# Patient Record
Sex: FEMALE | Race: WHITE | NOT HISPANIC OR LATINO
[De-identification: names, ages, dates, MRNs, and addresses within clinical notes are randomized per-mention and may not be internally consistent; named-entity substitution may affect disease eponyms.]

---

## 2017-01-18 ENCOUNTER — APPOINTMENT (OUTPATIENT)
Dept: ENDOCRINOLOGY | Facility: CLINIC | Age: 62
End: 2017-01-18

## 2017-01-18 VITALS — HEART RATE: 80 BPM

## 2018-01-15 ENCOUNTER — APPOINTMENT (OUTPATIENT)
Dept: ENDOCRINOLOGY | Facility: CLINIC | Age: 63
End: 2018-01-15
Payer: COMMERCIAL

## 2018-01-15 VITALS
SYSTOLIC BLOOD PRESSURE: 120 MMHG | WEIGHT: 112 LBS | BODY MASS INDEX: 18.66 KG/M2 | HEIGHT: 65 IN | HEART RATE: 78 BPM | DIASTOLIC BLOOD PRESSURE: 68 MMHG

## 2018-01-15 PROCEDURE — 99213 OFFICE O/P EST LOW 20 MIN: CPT

## 2019-01-15 ENCOUNTER — APPOINTMENT (OUTPATIENT)
Dept: ENDOCRINOLOGY | Facility: CLINIC | Age: 64
End: 2019-01-15
Payer: MEDICARE

## 2019-01-15 VITALS
SYSTOLIC BLOOD PRESSURE: 110 MMHG | HEIGHT: 65 IN | BODY MASS INDEX: 19.22 KG/M2 | WEIGHT: 115.38 LBS | DIASTOLIC BLOOD PRESSURE: 89 MMHG

## 2019-01-15 PROCEDURE — 99213 OFFICE O/P EST LOW 20 MIN: CPT

## 2019-01-15 RX ORDER — LACTOBACILLUS ACIDOPHILUS/PECT 30 MG-20MG
TABLET ORAL
Refills: 0 | Status: ACTIVE | COMMUNITY

## 2019-01-15 RX ORDER — MAGNESIUM OXIDE/MAG AA CHELATE 300 MG
CAPSULE ORAL
Refills: 0 | Status: ACTIVE | COMMUNITY

## 2019-01-15 NOTE — PHYSICAL EXAM
[Alert] : alert [No Acute Distress] : no acute distress [Well Nourished] : well nourished [Well Developed] : well developed [Normal Sclera/Conjunctiva] : normal sclera/conjunctiva [EOMI] : extra ocular movement intact [No Proptosis] : no proptosis [Normal Oropharynx] : the oropharynx was normal [Thyroid Not Enlarged] : the thyroid was not enlarged [No Thyroid Nodules] : there were no palpable thyroid nodules [No Respiratory Distress] : no respiratory distress [No Accessory Muscle Use] : no accessory muscle use [Clear to Auscultation] : lungs were clear to auscultation bilaterally [Normal Rate] : heart rate was normal  [Normal S1, S2] : normal S1 and S2 [Regular Rhythm] : with a regular rhythm [Pedal Pulses Normal] : the pedal pulses are present [No Edema] : there was no peripheral edema [Normal Bowel Sounds] : normal bowel sounds [Not Tender] : non-tender [Soft] : abdomen soft [Not Distended] : not distended [Post Cervical Nodes] : posterior cervical nodes [Anterior Cervical Nodes] : anterior cervical nodes [Axillary Nodes] : axillary nodes [Normal] : normal and non tender [No Stigmata of Cushings Syndrome] : no stigmata of cushings syndrome [Normal Gait] : normal gait [Normal Strength/Tone] : muscle strength and tone were normal [No Rash] : no rash [Acanthosis Nigricans] : no acanthosis nigricans [Normal Reflexes] : deep tendon reflexes were 2+ and symmetric [No Tremors] : no tremors [Oriented x3] : oriented to person, place, and time [de-identified] : Lower spine prominence with the curvature

## 2019-01-15 NOTE — HISTORY OF PRESENT ILLNESS
[FreeTextEntry1] : Patient with long-standing history of a multinodular goiter discovered while she was being evaluated for breast cancer. She has had the thyroid nodules biopsies in the past. She denies any local neck discomfort or hoarseness and she is clinically euthyroid. She had a thyroid ultrasound recently which  was stable .Her recent TSH was normal. She states she has been on medications for breast cancer she has developed scoliosis. She was told she has osteopenia of the spine.

## 2019-01-15 NOTE — ASSESSMENT
[FreeTextEntry1] : Patient will multinodular goiter whichh is stable on exam and recent sonogram. Since her thyroid function is normal she will follow up in one year and call if there is any change in her health status. She will give me a copy of her bone density report as well as the MRI of the spine which I said would review for her. A1C slightly high so diet was discussed,

## 2020-01-21 ENCOUNTER — APPOINTMENT (OUTPATIENT)
Dept: ENDOCRINOLOGY | Facility: CLINIC | Age: 65
End: 2020-01-21
Payer: MEDICARE

## 2020-01-21 VITALS
OXYGEN SATURATION: 96 % | SYSTOLIC BLOOD PRESSURE: 110 MMHG | DIASTOLIC BLOOD PRESSURE: 82 MMHG | WEIGHT: 112 LBS | HEIGHT: 64.5 IN | BODY MASS INDEX: 18.89 KG/M2 | HEART RATE: 83 BPM

## 2020-01-21 DIAGNOSIS — M85.80 OTHER SPECIFIED DISORDERS OF BONE DENSITY AND STRUCTURE, UNSPECIFIED SITE: ICD-10-CM

## 2020-01-21 PROCEDURE — 99214 OFFICE O/P EST MOD 30 MIN: CPT

## 2020-01-21 NOTE — PHYSICAL EXAM
[Alert] : alert [No Acute Distress] : no acute distress [Well Nourished] : well nourished [Well Developed] : well developed [Normal Sclera/Conjunctiva] : normal sclera/conjunctiva [EOMI] : extra ocular movement intact [No Proptosis] : no proptosis [Normal Oropharynx] : the oropharynx was normal [Thyroid Not Enlarged] : the thyroid was not enlarged [No Thyroid Nodules] : there were no palpable thyroid nodules [No Respiratory Distress] : no respiratory distress [No Accessory Muscle Use] : no accessory muscle use [Clear to Auscultation] : lungs were clear to auscultation bilaterally [Normal S1, S2] : normal S1 and S2 [Normal Rate] : heart rate was normal  [Pedal Pulses Normal] : the pedal pulses are present [Regular Rhythm] : with a regular rhythm [No Edema] : there was no peripheral edema [Not Tender] : non-tender [Normal Bowel Sounds] : normal bowel sounds [Soft] : abdomen soft [Not Distended] : not distended [Post Cervical Nodes] : posterior cervical nodes [Axillary Nodes] : axillary nodes [Anterior Cervical Nodes] : anterior cervical nodes [Normal] : normal and non tender [No Stigmata of Cushings Syndrome] : no stigmata of cushings syndrome [Normal Gait] : normal gait [Normal Strength/Tone] : muscle strength and tone were normal [No Rash] : no rash [Acanthosis Nigricans] : no acanthosis nigricans [Normal Reflexes] : deep tendon reflexes were 2+ and symmetric [No Tremors] : no tremors [Oriented x3] : oriented to person, place, and time [de-identified] : Lower spine prominence with the curvature

## 2020-01-21 NOTE — HISTORY OF PRESENT ILLNESS
[FreeTextEntry1] : Patient with long-standing history of a multinodular goiter discovered while she was being evaluated for breast cancer. She has had the thyroid nodules biopsies in the past. She denies any local neck discomfort or hoarseness and she is clinically euthyroid. She had a thyroid ultrasound recently which  was stable .Her recent TSH was normal. She states she has been on medications for breast cancer she has developed scoliosis. She was told she has osteopenia of the spine. She feels her hair is thinning. She may need her breast implants changed because she was told there is a low risk of cancer associated with this type.

## 2020-01-21 NOTE — ASSESSMENT
[FreeTextEntry1] : Patient will multinodular goiter which is stable on exam and recent sonogram. Since her thyroid function is normal she will follow up in one year and call if there is any change in her health status. She will give me a copy of her bone density report as well as the MRI of the spine which I said would review for her. A1C slightly high so diet was discussed, We are calling radiology for the full BD reports for the past 2 years. Will call to discuss. She feels she has lost over 3 inches. She will consider having more labs addressing hair thinning.  We briefly discussed spironolactone.  [Importance of Diet and Exercise] : importance of diet and exercise to improve glycemic control, achieve weight loss and improve cardiovascular health

## 2020-02-13 ENCOUNTER — LABORATORY RESULT (OUTPATIENT)
Age: 65
End: 2020-02-13

## 2020-02-13 LAB
CORTIS SERPL-MCNC: 12.5 UG/DL
FSH SERPL-MCNC: 61.3 IU/L
LH SERPL-ACNC: 27.2 IU/L
SAVE SPECIMEN: NORMAL
T4 FREE SERPL-MCNC: 1.2 NG/DL

## 2020-02-18 LAB
ALBUMIN SERPL ELPH-MCNC: 4.5 G/DL
ALP BLD-CCNC: 95 U/L
ALP BONE SERPL-MCNC: 22 MCG/L
ALT SERPL-CCNC: 23 U/L
ANION GAP SERPL CALC-SCNC: 20 MMOL/L
AST SERPL-CCNC: 28 U/L
BILIRUB SERPL-MCNC: 0.4 MG/DL
BUN SERPL-MCNC: 13 MG/DL
CALCIUM SERPL-MCNC: 9.7 MG/DL
CALCIUM SERPL-MCNC: 9.7 MG/DL
CHLORIDE SERPL-SCNC: 104 MMOL/L
CO2 SERPL-SCNC: 20 MMOL/L
COLLAGEN CTX SERPL-MCNC: 961 PG/ML
CREAT SERPL-MCNC: 0.65 MG/DL
DEPRECATED KAPPA LC FREE/LAMBDA SER: 1.49 RATIO
FREE KAPPA URINE: 2.58 MG/L
FREE KAPPA/LAMDA RATIO: 6.79
FREE LAMDA URINE: 0.38 MG/L
GLUCOSE SERPL-MCNC: 93 MG/DL
KAPPA LC CSF-MCNC: 0.79 MG/DL
KAPPA LC SERPL-MCNC: 1.18 MG/DL
M PROTEIN SPEC IFE-MCNC: NORMAL
PARATHYROID HORMONE INTACT: 23 PG/ML
POTASSIUM SERPL-SCNC: 4.1 MMOL/L
PROT SERPL-MCNC: 6.6 G/DL
SODIUM SERPL-SCNC: 144 MMOL/L
TRYPTASE: 7.2 NG/ML

## 2020-03-02 LAB
MISCELLANEOUS TEST: NORMAL
PROC NAME: NORMAL

## 2020-06-10 ENCOUNTER — APPOINTMENT (OUTPATIENT)
Dept: ENDOCRINOLOGY | Facility: CLINIC | Age: 65
End: 2020-06-10

## 2021-01-19 ENCOUNTER — APPOINTMENT (OUTPATIENT)
Dept: ENDOCRINOLOGY | Facility: CLINIC | Age: 66
End: 2021-01-19
Payer: MEDICARE

## 2021-01-19 PROCEDURE — 99213 OFFICE O/P EST LOW 20 MIN: CPT | Mod: 95

## 2021-01-19 RX ORDER — ALENDRONATE SODIUM 70 MG/1
70 TABLET ORAL
Qty: 12 | Refills: 1 | Status: ACTIVE | COMMUNITY
Start: 2020-03-05 | End: 1900-01-01

## 2021-01-19 NOTE — HISTORY OF PRESENT ILLNESS
[Home] : at home, [unfilled] , at the time of the visit. [Medical Office: ([facility])___] : at the medical office located in  [Verbal consent obtained from patient] : the patient, [unfilled] [FreeTextEntry1] : Patient with long-standing history of a multinodular goiter discovered while she was being evaluated for breast cancer. She has had the thyroid nodules biopsies in the past. She denies any local neck discomfort or hoarseness and she is clinically euthyroid. She had a thyroid ultrasound recently which  was stable .Her recent TSH was normal. She states she has been on medications for breast cancer she has developed scoliosis. Since last year she has been on Alendronate for osteoporosis.

## 2021-01-19 NOTE — ASSESSMENT
[FreeTextEntry1] : Patient will multinodular goiter which was last year on a sonogram. Recent thyroid function is normal .Held off doing sonogram due to pandemic. Will try to repeat prior to next visit. \par Bone density  showed T score - 2.8 neck of right hip in 2019 . Significant scoliosis so could not rely on spine reading. She feels she has lost over 3 inches.She has been tolerating alendronate now for almost one year. \par f/u with labs in 6 months.  [Bisphosphonate Therapy] : Risks and benefits of bisphosphonate therapy were  discussed with the patient including gastroesophageal irritation, osteonecrosis of the jaw, and atypical femur fractures, and acute phase reaction [Bisphosphonates] : The patient was instructed to take bisphosphonates on an empty stomach with a full glass of water,and wait at least 30 minutes before eating or lying down

## 2021-01-19 NOTE — PHYSICAL EXAM
[Alert] : alert [No Acute Distress] : no acute distress [Normal Voice/Communication] : normal voice communication [Thyroid Not Enlarged] : the thyroid was not enlarged [No Respiratory Distress] : no respiratory distress [No Rash] : no rash [Oriented x3] : oriented to person, place, and time [Normal Insight/Judgement] : insight and judgment were intact [de-identified] : Limited exam due to Telehealth visit secondary to Covid pandemic

## 2021-02-15 ENCOUNTER — NON-APPOINTMENT (OUTPATIENT)
Age: 66
End: 2021-02-15

## 2023-03-20 ENCOUNTER — APPOINTMENT (OUTPATIENT)
Dept: ENDOCRINOLOGY | Facility: CLINIC | Age: 68
End: 2023-03-20
Payer: MEDICARE

## 2023-03-20 VITALS
DIASTOLIC BLOOD PRESSURE: 84 MMHG | HEART RATE: 92 BPM | OXYGEN SATURATION: 96 % | SYSTOLIC BLOOD PRESSURE: 136 MMHG | BODY MASS INDEX: 19.46 KG/M2 | HEIGHT: 64.5 IN | WEIGHT: 115.38 LBS

## 2023-03-20 PROCEDURE — 99214 OFFICE O/P EST MOD 30 MIN: CPT

## 2023-03-20 NOTE — REASON FOR VISIT
[Follow - Up] : a follow-up visit [Thyroid nodule/ MNG] : thyroid nodule/ MNG [Osteoporosis] : osteoporosis

## 2023-03-20 NOTE — HISTORY OF PRESENT ILLNESS
[FreeTextEntry1] : Patient with long-standing history of a multinodular goiter discovered while she was being evaluated for breast cancer. She has had the thyroid nodules biopsies in the past. She denies any local neck discomfort or hoarseness and she is clinically euthyroid. She had a thyroid ultrasound recently which  was stable .Her recent TSH was normal. She states she has been on medications for breast cancer she has developed scoliosis. Since last year she has been on Alendronate for osteoporosis.

## 2023-03-20 NOTE — PHYSICAL EXAM
[Alert] : alert [No Acute Distress] : no acute distress [Normal Voice/Communication] : normal voice communication [Thyroid Not Enlarged] : the thyroid was not enlarged [No Respiratory Distress] : no respiratory distress [No Rash] : no rash [Oriented x3] : oriented to person, place, and time [Normal Insight/Judgement] : insight and judgment were intact [de-identified] : Limited exam due to Telehealth visit secondary to Covid pandemic

## 2023-03-20 NOTE — ASSESSMENT
[Bisphosphonate Therapy] : Risks and benefits of bisphosphonate therapy were  discussed with the patient including gastroesophageal irritation, osteonecrosis of the jaw, and atypical femur fractures, and acute phase reaction [Bisphosphonates] : The patient was instructed to take bisphosphonates on an empty stomach with a full glass of water,and wait at least 30 minutes before eating or lying down [FreeTextEntry1] : Patient will multinodular goiter which was last year on a sonogram. Recent thyroid function is normal .Held off doing sonogram due to pandemic. Will try to repeat prior to next visit. \par Bone density  showed T score - 2.8 neck of right hip in 2019 . Significant scoliosis so could not rely on spine reading. She feels she has lost over 3 inches.She has been tolerating alendronate now for almost one year. \par f/u  BD 11/23

## 2023-11-27 ENCOUNTER — APPOINTMENT (OUTPATIENT)
Dept: ENDOCRINOLOGY | Facility: CLINIC | Age: 68
End: 2023-11-27
Payer: MEDICARE

## 2023-11-27 VITALS
BODY MASS INDEX: 18.5 KG/M2 | HEIGHT: 64.25 IN | WEIGHT: 108.38 LBS | DIASTOLIC BLOOD PRESSURE: 80 MMHG | OXYGEN SATURATION: 96 % | SYSTOLIC BLOOD PRESSURE: 136 MMHG | HEART RATE: 87 BPM

## 2023-11-27 PROCEDURE — 99214 OFFICE O/P EST MOD 30 MIN: CPT

## 2024-02-01 ENCOUNTER — NON-APPOINTMENT (OUTPATIENT)
Age: 69
End: 2024-02-01

## 2024-05-28 ENCOUNTER — APPOINTMENT (OUTPATIENT)
Dept: ENDOCRINOLOGY | Facility: CLINIC | Age: 69
End: 2024-05-28
Payer: MEDICARE

## 2024-05-28 VITALS
HEIGHT: 64.25 IN | HEART RATE: 87 BPM | SYSTOLIC BLOOD PRESSURE: 128 MMHG | WEIGHT: 114.13 LBS | BODY MASS INDEX: 19.49 KG/M2 | OXYGEN SATURATION: 99 % | DIASTOLIC BLOOD PRESSURE: 80 MMHG

## 2024-05-28 DIAGNOSIS — E04.2 NONTOXIC MULTINODULAR GOITER: ICD-10-CM

## 2024-05-28 DIAGNOSIS — M81.0 AGE-RELATED OSTEOPOROSIS W/OUT CURRENT PATHOLOGICAL FRACTURE: ICD-10-CM

## 2024-05-28 PROCEDURE — 99214 OFFICE O/P EST MOD 30 MIN: CPT

## 2024-05-28 NOTE — HISTORY OF PRESENT ILLNESS
[FreeTextEntry1] : Patient with long-standing history of a multinodular goiter discovered while she was being evaluated for breast cancer. She has had the thyroid nodules biopsies in the past. She denies any local neck discomfort or hoarseness and she is clinically euthyroid. She had a thyroid ultrasound recently which was stable. Her most recent TSH was normal. She states she has been on medications for breast cancer she has developed scoliosis. she has been on Alendronate for osteoporosis needs to find out when actually started. .  Receiving medication from her gynecologist and the plan was to treat for 5 years.

## 2024-05-28 NOTE — ASSESSMENT
[FreeTextEntry1] : Patient will multinodular goiter recently repeated sonogram at a new facility overall size stable would repeat one year. There is a 1.1 cm nodule with calcifications. Will see if can find old biopsy reports.  To do sonogram in November and follow-up in 6 months  Bone density showed T score - 2.8 neck of right hip in 2019 now T score -.2.4.. . Significant scoliosis so could not rely on spine reading. She feels she has lost over 3 inches. She has been tolerating alendronate probably close to 3 years with improvement in both hops over the years. Consider drug holiday will check with pharmacist when started. .   encourage weight bearing exercise vitamin D and calcium supplement to do labs with Dr Merida

## 2024-05-28 NOTE — PHYSICAL EXAM
[Alert] : alert [No Acute Distress] : no acute distress [Normal Voice/Communication] : normal voice communication [No Respiratory Distress] : no respiratory distress [Clear to Auscultation] : lungs were clear to auscultation bilaterally [Normal PMI] : the apical impulse was normal [Normal Rate] : heart rate was normal [No Edema] : no peripheral edema [Normal Bowel Sounds] : normal bowel sounds [Soft] : abdomen soft [No Rash] : no rash [Oriented x3] : oriented to person, place, and time [Normal Insight/Judgement] : insight and judgment were intact [de-identified] : thyroid full no discrete nodules appreciated 60 G

## 2024-11-21 ENCOUNTER — APPOINTMENT (OUTPATIENT)
Dept: ENDOCRINOLOGY | Facility: CLINIC | Age: 69
End: 2024-11-21
Payer: MEDICARE

## 2024-11-21 VITALS
BODY MASS INDEX: 19.12 KG/M2 | SYSTOLIC BLOOD PRESSURE: 128 MMHG | OXYGEN SATURATION: 97 % | HEIGHT: 64.25 IN | DIASTOLIC BLOOD PRESSURE: 80 MMHG | WEIGHT: 112 LBS | HEART RATE: 82 BPM

## 2024-11-21 DIAGNOSIS — M81.0 AGE-RELATED OSTEOPOROSIS W/OUT CURRENT PATHOLOGICAL FRACTURE: ICD-10-CM

## 2024-11-21 DIAGNOSIS — E04.2 NONTOXIC MULTINODULAR GOITER: ICD-10-CM

## 2024-11-21 PROCEDURE — 99214 OFFICE O/P EST MOD 30 MIN: CPT

## 2025-05-29 ENCOUNTER — APPOINTMENT (OUTPATIENT)
Dept: ENDOCRINOLOGY | Facility: CLINIC | Age: 70
End: 2025-05-29
Payer: MEDICARE

## 2025-05-29 VITALS
WEIGHT: 115.13 LBS | BODY MASS INDEX: 19.65 KG/M2 | HEART RATE: 83 BPM | HEIGHT: 64.25 IN | DIASTOLIC BLOOD PRESSURE: 76 MMHG | OXYGEN SATURATION: 97 % | SYSTOLIC BLOOD PRESSURE: 128 MMHG

## 2025-05-29 DIAGNOSIS — M81.0 AGE-RELATED OSTEOPOROSIS W/OUT CURRENT PATHOLOGICAL FRACTURE: ICD-10-CM

## 2025-05-29 DIAGNOSIS — E04.2 NONTOXIC MULTINODULAR GOITER: ICD-10-CM

## 2025-05-29 DIAGNOSIS — R73.02 IMPAIRED GLUCOSE TOLERANCE (ORAL): ICD-10-CM

## 2025-05-29 LAB
GLUCOSE BLDC GLUCOMTR-MCNC: 109
HBA1C MFR BLD HPLC: 5.8

## 2025-05-29 PROCEDURE — 83036 HEMOGLOBIN GLYCOSYLATED A1C: CPT | Mod: QW

## 2025-05-29 PROCEDURE — 36415 COLL VENOUS BLD VENIPUNCTURE: CPT

## 2025-05-29 PROCEDURE — 99214 OFFICE O/P EST MOD 30 MIN: CPT

## 2025-05-29 PROCEDURE — G2211 COMPLEX E/M VISIT ADD ON: CPT

## 2025-05-30 LAB
25(OH)D3 SERPL-MCNC: 35.4 NG/ML
T4 FREE SERPL-MCNC: 1.1 NG/DL
T4 SERPL-MCNC: 6.3 UG/DL
TSH SERPL-ACNC: 1.21 UIU/ML